# Patient Record
Sex: FEMALE | Race: WHITE | URBAN - METROPOLITAN AREA
[De-identification: names, ages, dates, MRNs, and addresses within clinical notes are randomized per-mention and may not be internally consistent; named-entity substitution may affect disease eponyms.]

---

## 2017-02-28 ENCOUNTER — OFFICE VISIT (OUTPATIENT)
Dept: OBGYN CLINIC | Facility: CLINIC | Age: 32
End: 2017-02-28

## 2017-02-28 VITALS
HEIGHT: 64 IN | DIASTOLIC BLOOD PRESSURE: 74 MMHG | SYSTOLIC BLOOD PRESSURE: 100 MMHG | BODY MASS INDEX: 23.45 KG/M2 | WEIGHT: 137.38 LBS

## 2017-02-28 DIAGNOSIS — Z30.430 ENCOUNTER FOR INSERTION OF INTRAUTERINE CONTRACEPTIVE DEVICE: Primary | ICD-10-CM

## 2017-02-28 PROCEDURE — 58300 INSERT INTRAUTERINE DEVICE: CPT | Performed by: OBSTETRICS & GYNECOLOGY

## 2017-02-28 RX ORDER — COPPER 313.4 MG/1
1 INTRAUTERINE DEVICE INTRAUTERINE ONCE
Status: COMPLETED | OUTPATIENT
Start: 2017-02-28 | End: 2017-02-28

## 2017-02-28 RX ADMIN — COPPER 1 DEVICE: 313.4 INTRAUTERINE DEVICE INTRAUTERINE at 14:40:00

## 2017-02-28 NOTE — PROGRESS NOTES
GYNECOLOGY RETURN VISIT    2017  2:03 PM    CC:Patient presents for IUD insert    HPI: Patient is a 33year old  here for IUD insert. She had 4 th baby , , no complications. Not really breast feeding.     Periods are q 28 Days wi last up to 90 days as well as some short term lower abdominal cramping. I discussed with her to call me if she noticed worsening lower abdominal pain, fever, chills, nausea, vomiting, or a foul vaginal discharge.   I also discussed with her how to feel for

## 2018-10-05 ENCOUNTER — TELEPHONE (OUTPATIENT)
Dept: OBGYN CLINIC | Facility: CLINIC | Age: 33
End: 2018-10-05

## (undated) NOTE — MR AVS SNAPSHOT
[address]  at [place] [address]  [phone]               Thank you for choosing us for your health care visit with Vernon Yuen MD.  We are glad to serve you and happy to jack Your unique [email or web address] Access Code: [email or web address]  Expires: 4/29/2017  1:25 PM    If you have questions, you can call [phone] to talk to our [facility] Staff. Remember, [email or web address] is NOT to be used for urgent needs. For medical emergencies, dial 911.

## (undated) NOTE — Clinical Note
Nikita Garrison, :1985    CONSENT FOR PROCEDURE/SEDATION    1. I authorize the performance upon Nikita Garrison  the following: Insertion IUD    2.  I authorize Dr. Logan Valdez MD (and whomever is designated as the doctor’s assistant) Witness: _________________________________________ Date:___________     Physician Signature: _______________________________ Date:___________